# Patient Record
Sex: FEMALE | Race: WHITE | NOT HISPANIC OR LATINO | Employment: UNEMPLOYED | RURAL
[De-identification: names, ages, dates, MRNs, and addresses within clinical notes are randomized per-mention and may not be internally consistent; named-entity substitution may affect disease eponyms.]

---

## 2024-09-11 ENCOUNTER — HOSPITAL ENCOUNTER (EMERGENCY)
Facility: HOSPITAL | Age: 44
Discharge: LEFT BEFORE DISCHARGE | End: 2024-09-11
Attending: EMERGENCY MEDICINE
Payer: COMMERCIAL

## 2024-09-11 VITALS
WEIGHT: 104 LBS | HEIGHT: 62 IN | RESPIRATION RATE: 16 BRPM | SYSTOLIC BLOOD PRESSURE: 119 MMHG | BODY MASS INDEX: 19.14 KG/M2 | TEMPERATURE: 98 F | DIASTOLIC BLOOD PRESSURE: 80 MMHG | HEART RATE: 76 BPM | OXYGEN SATURATION: 100 %

## 2024-09-11 DIAGNOSIS — R53.1 WEAKNESS: Primary | ICD-10-CM

## 2024-09-11 PROCEDURE — 99281 EMR DPT VST MAYX REQ PHY/QHP: CPT

## 2024-09-11 NOTE — ED PROVIDER NOTES
Encounter Date: 2024       History     Chief Complaint   Patient presents with    General Illness     44 y/o female with multiple vague complaints.  She says she walks all over the place.  She says that she doesn't drink enough fluids.  She says that this is not new.  She says she may be dehydrated.  She says her ears feel full.  She says her knees hurt from a fall 2 days ago.  She reports that she is homeless and has no place to stay.        Review of patient's allergies indicates:   Allergen Reactions    Lorazepam     Sulfamethoxazole-trimethoprim      Past Medical History:   Diagnosis Date    Anemia     Anxiety     Depression     Encounter for blood transfusion     Hypertension      Past Surgical History:   Procedure Laterality Date     SECTION      SPLENECTOMY, TOTAL      TONSILLECTOMY       No family history on file.  Social History     Tobacco Use    Smoking status: Every Day     Types: Cigarettes    Smokeless tobacco: Never   Substance Use Topics    Alcohol use: Never    Drug use: Never     Review of Systems   All other systems reviewed and are negative.      Physical Exam     Initial Vitals   BP Pulse Resp Temp SpO2   24 0220 24 0220 24 0221 24 0220 24 0220   119/80 76 16 97.9 °F (36.6 °C) 100 %      MAP       --                Physical Exam    Nursing note and vitals reviewed.  Constitutional: She appears well-developed and well-nourished.   HENT:   Head: Normocephalic and atraumatic.   Nose: Nose normal.   Mouth/Throat: Oropharynx is clear and moist.   Eyes: Conjunctivae and EOM are normal. Pupils are equal, round, and reactive to light.   Neck: Neck supple.   Normal range of motion.  Cardiovascular:  Normal rate, regular rhythm and normal heart sounds.           Pulmonary/Chest: Breath sounds normal.   Abdominal: Abdomen is soft. Bowel sounds are normal.   Musculoskeletal:         General: Normal range of motion.      Cervical back: Normal range of motion and  neck supple.     Neurological: She is alert and oriented to person, place, and time. She has normal strength. GCS score is 15. GCS eye subscore is 4. GCS verbal subscore is 5. GCS motor subscore is 6.   Skin: Skin is warm and dry. Capillary refill takes less than 2 seconds.         Medical Screening Exam   See Full Note    ED Course   Procedures  Labs Reviewed   CBC W/ AUTO DIFFERENTIAL    Narrative:     The following orders were created for panel order CBC auto differential.  Procedure                               Abnormality         Status                     ---------                               -----------         ------                     CBC with Differential[[id number]]                                                         Please view results for these tests on the individual orders.   BASIC METABOLIC PANEL   URINALYSIS, REFLEX TO URINE CULTURE   DRUG SCREEN, URINE (BEAKER)   CBC WITH DIFFERENTIAL          Imaging Results    None          Medications - No data to display  Medical Decision Making  Amount and/or Complexity of Data Reviewed  Labs: ordered.                                      Clinical Impression:   Final diagnoses:  [R53.1] Weakness (Primary)        ED Disposition Condition    Eloped Stable                Nick Jean Baptiste MD  09/11/24 0324

## 2024-09-11 NOTE — ED NOTES
Lab in to draw blood and was unsuccessful on 1st attempt; pt then refused to be stuck by  again;  I then went in to obtain lab but did not stick;  I informed pt that I was going to send my charge nurse in;  when sherry went to stick, pt was gone

## 2024-09-13 ENCOUNTER — TELEPHONE (OUTPATIENT)
Dept: EMERGENCY MEDICINE | Facility: HOSPITAL | Age: 44
End: 2024-09-13
Payer: COMMERCIAL